# Patient Record
Sex: MALE | Race: WHITE
[De-identification: names, ages, dates, MRNs, and addresses within clinical notes are randomized per-mention and may not be internally consistent; named-entity substitution may affect disease eponyms.]

---

## 2019-12-09 ENCOUNTER — HOSPITAL ENCOUNTER (OUTPATIENT)
Dept: HOSPITAL 43 - DL.ENDO | Age: 69
Discharge: HOME | End: 2019-12-09
Attending: INTERNAL MEDICINE
Payer: MEDICARE

## 2019-12-09 DIAGNOSIS — Z87.891: ICD-10-CM

## 2019-12-09 DIAGNOSIS — K22.2: ICD-10-CM

## 2019-12-09 DIAGNOSIS — M19.90: ICD-10-CM

## 2019-12-09 DIAGNOSIS — E66.09: ICD-10-CM

## 2019-12-09 DIAGNOSIS — C15.5: Primary | ICD-10-CM

## 2019-12-09 DIAGNOSIS — I48.91: ICD-10-CM

## 2019-12-09 DIAGNOSIS — I10: ICD-10-CM

## 2019-12-09 NOTE — OR
DATE:  12/09/2019

 

PROCEDURES:  Esophagoscopy, NBI, multiple pinch biopsies, and brush biopsy.

 

INSTRUMENT USED:  GIF- Olympus video panendoscope.

 

PREMEDICATIONS:  No oral or topical anesthesia used.  Fentanyl 100 mcg

intravenous, Versed 2 mg intravenous, nasal O2 cannula.

 

The procedure was done under pulse oximetry, BP recording, and cardiac monitor.

 

INDICATION:  The patient with progressive solid dysphagia and weight loss.

Esophagoscopy was done for detection of any active erosive lesions, malignancy

also under consideration, endoscopic hemostasis therapy if needed.  The scope

was passed with ease.  Adequate visualization of the esophagus was made from

proximal to distal areas.  No upper esophageal lesions identified.  There was

distal esophageal stricture that prevented further advancement of the instrument

to visualize the gastric mucosa.  Malignant constricting circumferential folds

were noted in the distal esophagus, NBI views were obtained, photographs were

taken.  Numerous pinch biopsies were taken from the folds and sent for

histopathology.  Brush biopsy was also taken and sent for cytology.  No bleeding

was noted from any of the visualized areas at the completion of examination.

 

IMPRESSION:  Distal esophageal stricture.  Distal esophageal malignancy.

 

The patient tolerated the procedure well.

 

DD:  12/09/2019 07:23:43

DT:  12/09/2019 07:35:01

[facility]

Job #:  [id number]